# Patient Record
Sex: MALE | Race: WHITE | NOT HISPANIC OR LATINO | Employment: FULL TIME | URBAN - METROPOLITAN AREA
[De-identification: names, ages, dates, MRNs, and addresses within clinical notes are randomized per-mention and may not be internally consistent; named-entity substitution may affect disease eponyms.]

---

## 2019-07-24 ENCOUNTER — OFFICE VISIT (OUTPATIENT)
Dept: FAMILY MEDICINE | Facility: CLINIC | Age: 24
End: 2019-07-24
Payer: COMMERCIAL

## 2019-07-24 VITALS
HEART RATE: 95 BPM | WEIGHT: 144.2 LBS | SYSTOLIC BLOOD PRESSURE: 106 MMHG | TEMPERATURE: 98.4 F | BODY MASS INDEX: 19.53 KG/M2 | HEIGHT: 72 IN | OXYGEN SATURATION: 95 % | DIASTOLIC BLOOD PRESSURE: 75 MMHG

## 2019-07-24 DIAGNOSIS — Z00.00 ROUTINE HISTORY AND PHYSICAL EXAMINATION OF ADULT: Primary | ICD-10-CM

## 2019-07-24 DIAGNOSIS — Z20.2 EXPOSURE TO STD: ICD-10-CM

## 2019-07-24 DIAGNOSIS — Z20.6 HIV EXPOSURE: ICD-10-CM

## 2019-07-24 LAB
BUN SERPL-MCNC: 15.1 MG/DL (ref 7–21)
CALCIUM SERPL-MCNC: 9.5 MG/DL (ref 8.5–10.1)
CHLORIDE SERPLBLD-SCNC: 103.4 MMOL/L (ref 98–110)
CHOLEST SERPL-MCNC: 132.9 MG/DL (ref 0–200)
CHOLEST/HDLC SERPL: 2.8 {RATIO} (ref 0–5)
CO2 SERPL-SCNC: 30.5 MMOL/L (ref 20–32)
CREAT SERPL-MCNC: 0.8 MG/DL (ref 0.7–1.3)
GFR SERPL CREATININE-BSD FRML MDRD: >90 ML/MIN/1.7 M2
GLUCOSE CASUAL: 80 MG/DL (ref 51–200)
GLUCOSE SERPL-MCNC: 95 MG'DL (ref 70–99)
HBV SURFACE AB SERPL IA-ACNC: 0.51 M[IU]/ML
HBV SURFACE AG SERPL QL IA: NONREACTIVE
HCV AB SERPL QL IA: NONREACTIVE
HDLC SERPL-MCNC: 47.7 MG/DL
HIV 1+2 AB+HIV1 P24 AG SERPL QL IA: NONREACTIVE
LDLC SERPL CALC-MCNC: 59 MG/DL (ref 0–129)
POTASSIUM SERPL-SCNC: 3.9 MMOL/DL (ref 3.3–4.5)
SODIUM SERPL-SCNC: 138.4 MMOL/L (ref 132.6–141.4)
TRIGL SERPL-MCNC: 131.4 MG/DL (ref 0–150)
VLDL CHOLESTEROL: 26.3 MG/DL (ref 7–32)

## 2019-07-24 RX ORDER — EMTRICITABINE AND TENOFOVIR DISOPROXIL FUMARATE 200; 300 MG/1; MG/1
1 TABLET, FILM COATED ORAL DAILY
Qty: 90 TABLET | Refills: 0 | Status: SHIPPED | OUTPATIENT
Start: 2019-07-24

## 2019-07-24 ASSESSMENT — MIFFLIN-ST. JEOR: SCORE: 1682.09

## 2019-07-24 NOTE — PROGRESS NOTES
Male Physical Note    24 year oldmale here for CPE.    Other concerns include the following:  Concerns today:  1. Red spot  2. PrEP  3. Glass in foot      All - Med  Meds - no  Med Prob - no  Surg - no  Fx - no  Depression / anxiety - no    STD - no  Condom use:  >50%      PAST MEDICAL HISTORY  ALLERGIES  No Known Allergies    MEDS:   Current Outpatient Medications   Medication     emtricitabine-tenofovir (TRUVADA) 200-300 MG per tablet     No current facility-administered medications for this visit.        PROBLEM LIST  none      FAMILY HISTORY   grandparents with cancer (smokers)        SOCIAL HISTORY  Social History     Socioeconomic History     Marital status: Single     Spouse name: Not on file     Number of children: Not on file     Years of education: Not on file     Highest education level: Not on file   Occupational History     Occupation:      Comment: [employer]   Social Needs     Financial resource strain: Not on file     Food insecurity:     Worry: Not on file     Inability: Not on file     Transportation needs:     Medical: Not on file     Non-medical: Not on file   Tobacco Use     Smoking status: Never Smoker     Smokeless tobacco: Never Used   Substance and Sexual Activity     Alcohol use: Yes     Drug use: Yes     Types: Marijuana     Sexual activity: Yes     Partners: Male     Birth control/protection: Condom   Lifestyle     Physical activity:     Days per week: Not on file     Minutes per session: Not on file     Stress: Not on file   Relationships     Social connections:     Talks on phone: Not on file     Gets together: Not on file     Attends Moravian service: Not on file     Active member of club or organization: Not on file     Attends meetings of clubs or organizations: Not on file     Relationship status: Not on file     Intimate partner violence:     Fear of current or ex partner: Not on file     Emotionally abused: Not on file     Physically abused: Not on file      Forced sexual activity: Not on file   Other Topics Concern     Not on file   Social History [facility]  ([place])    Moved to \[facility]\"" - 2017    End of long term relationship - July 2019         Mental Health  Depression/Stress - none  Anxiety - none  Other      Other  Dental Care: Yes   Seat Belt Use: Yes       PREVENTIVE SCREENING    There is no immunization history on file for this patient.    Cholesterol Level (>46 yo or at risk):  done and HIV screening:  done          ROS:                      CONSTITUTIONAL: no fatigue, no unexpected change in weight  SKIN: no worrisome rashes, no worrisome moles, no worrisome lesions  EYES: no acute vision problems or changes  ENT: no ear problems, no mouth problems, no throat problems  RESP: no significant cough, no shortness of breath  CV: no chest pain, no palpitations, no new or worsening peripheral edema  GI: no nausea, no vomiting, no constipation, no diarrhea      EXAMINATION:  /75   Pulse 95   Temp 98.4  F (36.9  C) (Oral)   Ht 1.829 m (6')   Wt 65.4 kg (144 lb 3.2 oz)   SpO2 95%   BMI 19.56 kg/m    GENERAL: healthy, alert and no distress  EYES: Eyes grossly normal to inspection, extraocular movements - intact, and PERRL  HENT: ear canals- normal; TMs- normal; Nose- normal; Mouth- no ulcers, no lesions  NECK: no tenderness, no adenopathy, no asymmetry, no masses, no stiffness; thyroid- normal to palpation  RESP: lungs clear to auscultation - no rales, no rhonchi, no wheezes  CV: regular rates and rhythm, normal S1 S2, no S3 or S4 and no murmur, no click or rub -  ABDOMEN: soft, no tenderness, no  hepatosplenomegaly, no masses, normal bowel sounds  MS: extremities- no gross deformities noted, no edema  SKIN: no suspicious lesions, no rashes  NEURO: strength and tone- normal, sensory exam- grossly normal, mentation- intact, speech- normal, reflexes- symmetric  BACK: no CVA tenderness, no paralumbar tenderness  - male: testicles- normal, no  atrophy, no masses;  no inguinal hernias  PSYCH: Alert and oriented times 3; speech- coherent , normal rate and volume; able to articulate logical thoughts, able to abstract reason, no tangential thoughts, no hallucinations or delusions, affect- normal  LYMPHATICS: ant. cervical- normal, post. cervical- normal, axillary- normal, supraclavicular- normal, inguinal- normal        LABS  Pending -   BMP  STI  Lipid       ASSESSMENT/PLAN:    1. Routine history and physical examination of adult  - Lipid Cascade ([facility])  - Glucose Casual ([facility])    2. HIV exposure  Recently ended longterm relationship  Desires to start PrEP  Agreement reviewed  FAQ given - see AVS    - emtricitabine-tenofovir (TRUVADA) 200-300 MG per tablet; Take 1 tablet by mouth daily  Dispense: 90 tablet; Refill: 0    - Neisseria gonorrhoeae PCR  - Chlamydia trachomatis PCR  - Treponema Abs w Reflex to RPR and Titer  - HIV Antigen Antibody Combo  - Hepatitis B Surface Antibody  - Hepatitis B surface antigen  - Hepatitis C antibody  - Basic Metabolic Panel ([facility])      3. RTC 3 months  HIV,BMP  Renew miles Hernandez MD MPH

## 2019-07-24 NOTE — PATIENT INSTRUCTIONS
"  Pre-Exposure Prophylaxis (PrEP) for HIV Prevention   Frequently Asked Questions       What is PrEP?   \"PrEP\" stands for pre-exposure prophylaxis. The word \"prophylaxis\" (pronounced pro ryan ak sis) means to prevent or control the spread of an infection or disease. The goal of PrEP is to prevent HIV infection from taking hold if you are exposed to the virus. This is done by taking a pill that contains 2 HIV medications every day. These are the same medicines used to stop the virus from growing in people who are already infected.     Why take PrEP?   The HIV epidemic in the [place] is growing. About 50,000 people get infected with HIV each year. More of these infections are happening in some groups of people and some areas of the country than in others.     Is PrEP a vaccine?   No. PrEP medication does not work the same way as a vaccine. When you take a vaccine, it trains the body's immune system to fight off infection for years. You will need to take a pill every day by mouth for PrEP medications to protect you from infection. PrEP does not work after you stop taking it. The medication that was shown to be safe and to help block HIV infection is called \"Truvada\" (pronounced radha va duh). Truvada is a combination of 2 drugs (tenofovir and emtricitabine). These medicines work by blocking important pathways that the HIV virus uses to set up an infection. If you take Truvada as PrEP daily, the presence of the medication in your bloodstream can often stop the HIV virus from establishing itself and spreading in your body. If you do not take the Truvada pills every day, there may not be enough medicine in your blood stream to block the virus.     Should I consider taking PrEP?   PrEP is not for everyone. Doctors prescribe PrEP for some patients who have a very high risk of coming in contact with HIV by not using a condom when they have sex with a person who has HIV infection. You should consider PrEP if you are a man " or woman who sometimes has sex without using a condom, especially if you have a sex partner who you know has HIV infection. You should also consider PrEP if you don't know whether your partner has HIV infection but you know that your partner is at risk (for example, your partner injects drugs or is having sex with other people in addition to you) or if you have recently been told by a health care provider that you had a sexually transmitted infection. If your partner has HIV infection, PrEP may be an option to help protect you from getting HIV infection while you try to get pregnant, during pregnancy, or while breastfeeding.     How well does PrEP work?   PrEP was tested in several large studies with men who have sex with men, men who have sex with women and women who have sex with men. Several studies showed that PrEP reduced the risk of getting HIV infection.   More information on the details of these studies can be found at: http://www.cdc.gov/hiv/prep    Is PrEP safe?   The clinical trials also provided safety information on PrEP. Some people in the trials had early side effects such as an upset stomach or loss of appetite but these were mild and usually went away within the first month. Some people also had a mild headache. No serious side effects were observed. You should tell your doctor if these or other symptoms become severe or do not go away.     How can I start PrEP?   If you think you may be at high risk for HIV, talk to your doctor about PrEP. If you and your doctor agree that PrEP might reduce your risk of getting HIV infection, you will need to come in for a general health physical, blood tests for HIV, and tests for other infections that you can get from sex partners. Your blood will also be tested to see if your kidneys and liver are functioning well. If these tests show that PrEP medicines are likely to be safe for you to take and that you might benefit from PrEP, your doctor may give you a  prescription after discussing it with you.     Taking PrEP medicines will require you to follow-up regularly with your doctor. You will receive counseling on sexual behaviors and blood tests for HIV infection and to see if your body is reacting well to Truvada. You should take your medicine every day as prescribed, and your doctor will advise you about ways to help you take it regularly so that it stands the best chance to help you avoid HIV infection. Tell your doctor if you are having trouble remembering to take your medicine or if you want to stop PrEP.    If I take PrEP can I stop using condoms when I have sex?   You should not stop using condoms because you are taking PrEP. If PrEP is taken daily, it offers a lot of protection against HIV infection, but not 100%. Condoms also offer a lot of protection against HIV infection if they are used correctly every time you have sex, but not 100%. PrEP medications don't give you any protection from other infections you can get during sex, but condoms do. So you will get the most protection from HIV and other sexual infections if you consistently take PrEP medication and consistently use condoms during sex.       PrEP Agreement    It has been explained to me that:       Taking a dose of PrEP medication every day may lower my risk of getting HIV infection.    This medicine does not completely eliminate my risk of getting HIV infection, so I should use condoms during sex for additional protection.     This medicine may cause side effects so I should contact my provider for advice by calling [phone] if I have any health problems.      It is important for my health to find out quickly if I get HIV infection while I'm taking this medication, so I will contact my provider right away if I have symptoms of possible HIV infection (symptoms like fever with sore throat, rash, headache, or swollen glands).    My provider will test for HIV infection at least once every 3 months.      PrEP cannot be refilled without routine testing.      Therefore, I will:       Try my best to take the medication my provider has prescribed every day.     Talk to my provider about any problems I have in taking the medication every day.    Not share the medication with any other person.     Attend all my scheduled appointments/ complete all routine labs    Call [phone] to reschedule any appointments I cannot attend.

## 2019-07-24 NOTE — PROGRESS NOTES
"Male New Patient Note          HPI         Concerns today:  1. Red spot  2. PrEP  3. Glass in foot      All - Med  Meds - no  Med Prob - no  Surg - no  Fx - no  Depression / anxiety - no  STD - no            History reviewed. No pertinent family history.           Review of Systems:     Review of Systems:  {ROS NORMAL:136241::\"CONSTITUTIONAL: NEGATIVE for fever, chills, change in weight\",\"INTEGUMENTARY/SKIN: NEGATIVE for worrisome rashes, moles or lesions\",\"EYES: NEGATIVE for vision changes or irritation\",\"ENT/MOUTH: NEGATIVE for ear, mouth and throat problems\",\"RESP: NEGATIVE for significant cough or SOB\",\"BREAST: NEGATIVE for masses, tenderness or discharge\",\"CV: NEGATIVE for chest pain, palpitations or peripheral edema\",\"GI: NEGATIVE for nausea, abdominal pain, heartburn, or change in bowel habits\",\": NEGATIVE for frequency, dysuria, or hematuria\",\"MUSCULOSKELETAL: NEGATIVE for significant arthralgias or myalgia\",\"NEURO: NEGATIVE for weakness, dizziness or paresthesias\",\"ENDOCRINE: NEGATIVE for temperature intolerance, skin/hair changes\",\"HEME/ALLERGY: NEGATIVE for bleeding problems\",\"PSYCHIATRIC: NEGATIVE for changes in mood or affect\"}  Sleep:   Do you snore most or the night (as reported by a family member)? {NO IS DEFAULT/YES:27515911::\"No\"}  Do you feel sleepy or extremely tired during most of the day? {NO IS DEFAULT/YES:72969980::\"No\"}  {If both questions are answered with \"yes\" consider adding snoring in the problem list and evaluating the patient for PHILLIP with the dot phrase \".smicsleep\" either this visit or at a follow up visit }           Social History     Social History     Socioeconomic History     Marital status: Single     Spouse name: Not on file     Number of children: Not on file     Years of education: Not on file     Highest education level: Not on file   Occupational History     Not on file   Social Needs     Financial resource strain: Not on file     Food insecurity:     Worry: Not on file " "    Inability: Not on file     Transportation needs:     Medical: Not on file     Non-medical: Not on file   Tobacco Use     Smoking status: Never Smoker     Smokeless tobacco: Never Used   Substance and Sexual Activity     Alcohol use: Yes     Drug use: Yes     Types: Marijuana     Sexual activity: Yes     Partners: Male     Birth control/protection: Condom   Lifestyle     Physical activity:     Days per week: Not on file     Minutes per session: Not on file     Stress: Not on file   Relationships     Social connections:     Talks on phone: Not on file     Gets together: Not on file     Attends Restorationism service: Not on file     Active member of club or organization: Not on file     Attends meetings of clubs or organizations: Not on file     Relationship status: Not on file     Intimate partner violence:     Fear of current or ex partner: Not on file     Emotionally abused: Not on file     Physically abused: Not on file     Forced sexual activity: Not on file   Other Topics Concern     Not on file   Social History Narrative     Not on file       Marital Status:{MARITAL STATUS:852329}  Who lives in your household? ***    Has anyone hurt you physically, for example by pushing, hitting, slapping or kicking you or forcing you to have sex? {[facility] DENIES:883049::\"Denies\"}  Do you feel threatened or controlled by a partner, ex-partner or anyone in your life? {[facility] DENIES:249170::\"Denies\"}    Sexual Health     Sexual concerns: {YES / NO:033561::\"Yes\"}   STI History: {NEG/POS-NEG DEFAULT:002043::\"Neg\"}             Physical Exam:     Vitals: /75   Pulse 95   Temp 98.4  F (36.9  C) (Oral)   Ht 1.829 m (6')   Wt 65.4 kg (144 lb 3.2 oz)   SpO2 95%   BMI 19.56 kg/m    BMI= Body mass index is 19.56 kg/m .  {[facility] VITALS OPTIONS:545120::\"Vitals were reviewed and were normal\"}  {EXAM - MALE- zebra stripe:933669::\"GENERAL: healthy, alert and no distress\",\"EYES: Eyes grossly normal to inspection, extraocular movements - " "intact, and PERRL\",\"HENT: ear canals- normal; TMs- normal; Nose- normal; Mouth- no ulcers, no lesions\",\"NECK: no tenderness, no adenopathy, no asymmetry, no masses, no stiffness; thyroid- normal to palpation\",\"RESP: lungs clear to auscultation - no rales, no rhonchi, no wheezes\",\"BREAST: no masses, no tenderness, no nipple discharge, no palpable axillary masses or adenopathy\",\"CV: regular rates and rhythm, normal S1 S2, no S3 or S4 and no murmur, no click or rub -\",\"ABDOMEN: soft, no tenderness, no  hepatosplenomegaly, no masses, normal bowel sounds\",\"MS: extremities- no gross deformities noted, no edema\",\"SKIN: no suspicious lesions, no rashes\",\"NEURO: strength and tone- normal, sensory exam- grossly normal, mentation- intact, speech- normal, reflexes- symmetric\",\"BACK: no CVA tenderness, no paralumbar tenderness\",\"- male: testicles- normal, no atrophy, no masses;  no inguinal hernias\",\"RECTAL- male: no masses, no hemorhoids, Prostate- symmetric, no  nodularity, no masses, no hypertrophy\",\"PSYCH: Alert and oriented times 3; speech- coherent , normal rate and volume; able to articulate logical thoughts, able to abstract reason, no tangential thoughts, no hallucinations or delusions, affect- normal\",\"LYMPHATICS: ant. cervical- normal, post. cervical- normal, axillary- normal, supraclavicular- normal, inguinal- normal\"}    Assessment and Plan      There are no diagnoses linked to this encounter.      Options for treatment and follow-up care were reviewed with the patient. Davis Scott engaged in the decision making process and verbalized understanding of the options discussed and agreed with the final plan.    Rafael Mercedes MD  "

## 2019-07-25 ENCOUNTER — TELEPHONE (OUTPATIENT)
Dept: FAMILY MEDICINE | Facility: CLINIC | Age: 24
End: 2019-07-25

## 2019-07-25 ENCOUNTER — OFFICE VISIT (OUTPATIENT)
Dept: FAMILY MEDICINE | Facility: CLINIC | Age: 24
End: 2019-07-25
Payer: COMMERCIAL

## 2019-07-25 VITALS
TEMPERATURE: 98.2 F | SYSTOLIC BLOOD PRESSURE: 120 MMHG | RESPIRATION RATE: 16 BRPM | HEART RATE: 98 BPM | HEIGHT: 72 IN | OXYGEN SATURATION: 94 % | BODY MASS INDEX: 19.99 KG/M2 | WEIGHT: 147.6 LBS | DIASTOLIC BLOOD PRESSURE: 82 MMHG

## 2019-07-25 DIAGNOSIS — Z29.9 PREVENTIVE MEASURE: ICD-10-CM

## 2019-07-25 DIAGNOSIS — R36.9 PENILE DISCHARGE: Primary | ICD-10-CM

## 2019-07-25 LAB
C TRACH DNA SPEC QL NAA+PROBE: POSITIVE
N GONORRHOEA DNA SPEC QL NAA+PROBE: POSITIVE
SPECIMEN SOURCE: ABNORMAL
SPECIMEN SOURCE: ABNORMAL
T PALLIDUM AB SER QL: NONREACTIVE

## 2019-07-25 RX ORDER — AZITHROMYCIN 500 MG/1
1000 TABLET, FILM COATED ORAL DAILY
Qty: 2 TABLET | Refills: 0 | Status: SHIPPED | OUTPATIENT
Start: 2019-07-25 | End: 2019-07-26

## 2019-07-25 RX ORDER — CEFTRIAXONE SODIUM 250 MG
250 VIAL (EA) INJECTION ONCE
Status: COMPLETED | OUTPATIENT
Start: 2019-07-25 | End: 2019-07-25

## 2019-07-25 RX ADMIN — Medication 250 MG: at 17:06

## 2019-07-25 ASSESSMENT — PAIN SCALES - GENERAL: PAINLEVEL: NO PAIN (0)

## 2019-07-25 ASSESSMENT — MIFFLIN-ST. JEOR: SCORE: 1704.51

## 2019-07-25 NOTE — NURSING NOTE
Clinic Administered Medication Documentation      Injectable Medication Documentation    Patient was given Ceftriaxone Sodium (Rocephin). Prior to medication administration, verified patients identity using patient s name and date of birth. Please see MAR and medication order for additional information. Patient instructed to remain in clinic for 15 minutes.      Was entire vial of medication used? Yes  Vial/Syringe: Syringe  Expiration Date:  7/1/2021  Was this medication supplied by the patient? No    Name of provider who requested the medication administration: Radha  Name of provider on site (faculty or community preceptor) at the time of performing the medication administration: Pierre    Date of next administration: na   Date of next office visit with provider to renew medication plan (must be seen annually): swati Varghese, [facility]

## 2019-07-25 NOTE — TELEPHONE ENCOUNTER
Lab called and relayed that the patient had positive chlamydia and gonorrhea labs.  Ena Swanson, CMA

## 2019-07-25 NOTE — TELEPHONE ENCOUNTER
Pt came to clinic and treated by a provider in clinic after testing positive for STI both (Chalamydia and Gonorrhea).  See chart/Medication tab for details.    MD STD Case Report completed and mailed to [facility].    Carlos Marie RN

## 2019-07-25 NOTE — PROGRESS NOTES
"      HPI       Davis Scott is a 24 year old male, who presents with:  Chief Complaint   Patient presents with     RECHECK     patient is here for follow up no other concern      Was here for PrEP evaluation yesterday, noticed some penile discharge at night, checked in w/Dr. Mercedes who informed him that his GC/CH turned out positive, he is here for treatment.     Unprotected sex with one person since end of long-term relationship. Last sexual contact 5 days ago, with this person. 2 sexual contacts (including long-term partner) in the past 6 months.   He reports penetrating anal sex, oral sex only with this partner. No throat symptoms. No receiving anal intercourse.     He will contact partner to inform him of positive test and need for treatment. No h/o STI's, HIV, syphylis, hepB and hepC were negative.     He is unsure whether he was vaccinated for hepatitis B, but labs show no-immunity.     His insurance does not cover PrEP, [facility], as it is otherwise unaffordable.     Problem, Medication and Allergy Lists were reviewed and are current..    Patient is an established patient of this clinic.         Review of Systems     Complete ROS negative except as described in HPI.          Physical Exam     Vital signs:  /82   Pulse 98   Temp 98.2  F (36.8  C) (Oral)   Resp 16   Ht 1.84 m (6' 0.44\")   Wt 67 kg (147 lb 9.6 oz)   SpO2 94%   BMI 19.78 kg/m      Vitals were reviewed and were normal    General: very pleasant young male. Alert, interactive. Non-toxic.    HEENT: No signs of trauma. No rhinorrhea. Moist membranes.   Eyes: Conjunctivae are normal. Pupils are equal.   Neck: Normal range of motion.    Resp: No respiratory distress.    MSK: Normal range of motion. No obvious deformity.  Neuro: The patient is alert and interactive. Speech normal. No gross focal findings.   Skin: No lesion or sign of trauma noted.   Psych: Affect is concordant " with mood, behavior is normal.      Results     Results from last visit:  Office Visit on 07/24/2019   Component Date Value Ref Range Status     Specimen Descrip 07/24/2019 Urine   Final     N Gonorrhea PCR 07/24/2019 Positive* NEG^Negative Final    Comment: Positive for N. gonorrhoeae rRNA by transcription mediated amplification.  As is true for all non-culture methods, a positive specimen obtained from a   patient after therapeutic treatment cannot be interpreted as indicating the   presence of viable N. gonorrhoeae.  Significant Value messaged to  [facility] [phone] at 1140 on 07.25.19 ETK.       Specimen Description 07/24/2019 Urine   Final     Chlamydia Trachomatis PCR 07/24/2019 Positive* NEG^Negative Final    Comment: Positive for C. trachomatis rRNA by transcription mediated amplification.  As is true for all non-culture methods, a positive specimen obtained from a   patient after therapeutic treatment cannot be interpreted as indicating the   presence of viable C. trachomatis.  Significant Value messaged to  [facility] [phone] at 1140 on 07.25.19 ETK.       Treponema Antibodies 07/24/2019 Nonreactive  NR^Nonreactive Final     HIV Antigen Antibody Combo 07/24/2019 Nonreactive  NR^Nonreactive     Final    HIV-1 p24 Ag & HIV-1/HIV-2 Ab Not Detected     Hepatitis B Surface Antibody 07/24/2019 0.51  <8.00 m[IU]/mL Final    Nonreactive, No antibody detected when the value is less than 8.00 m[IU]/mL.     Hep B Surface Agn 07/24/2019 Nonreactive  NR^Nonreactive Final     Hepatitis C Antibody 07/24/2019 Nonreactive  NR^Nonreactive Final    Comment: Assay performance characteristics have not been established for newborns,   infants, and children       Cholesterol 07/24/2019 132.9  0.0 - 200.0 mg/dL Final     Cholesterol/HDL Ratio 07/24/2019 2.8  0.0 - 5.0 Final     HDL Cholesterol 07/24/2019 47.7  >40.0 mg/dL Final     LDL Cholesterol Calculated 07/24/2019 59  0 - 129 mg/dL Final     Triglycerides  07/24/2019 131.4  0.0 - 150.0 mg/dL Final     VLDL Cholesterol 07/24/2019 26.3  7.0 - 32.0 mg/dL Final     Glucose Casual 07/24/2019 80.0  51.0 - 200.0 mg/dL Final     Urea Nitrogen 07/24/2019 15.1  7.0 - 21.0 mg/dL Final     Calcium 07/24/2019 9.5  8.5 - 10.1 mg/dL Final     Chloride 07/24/2019 103.4  98.0 - 110.0 mmol/L Final     Carbon Dioxide 07/24/2019 30.5  20.0 - 32.0 mmol/L Final     Creatinine 07/24/2019 0.8  0.7 - 1.3 mg/dL Final     Glucose 07/24/2019 95.0  70.0 - 99.0 mg'dL Final     Potassium 07/24/2019 3.9  3.3 - 4.5 mmol/dL Final     Sodium 07/24/2019 138.4  132.6 - 141.4 mmol/L Final     GFR Estimate 07/24/2019 >90  >60.0 mL/min/1.7 m2 Final     GFR Estimate If Black 07/24/2019 >90  >60.0 mL/min/1.7 m2 Final       Assessment and Plan     1. Penile discharge  Will treat for GC and CT. Discussed no sex for 7 days, using condoms. Advised to re-check HIV in 3 months as his negative test yesterday does not ensure he doesn't have the infection.   He will contact partner to be checked and treated.   - azithromycin (ZITHROMAX) 500 MG tablet; Take 2 tablets (1,000 mg) by mouth daily for 1 day  Dispense: 2 tablet; Refill: 0  - cefTRIAXone (ROCEPHIN) injection 250 mg  - INJECTION INTRAMUSCULAR OR SUB-Q    2. Preventive measure  Recommended hepA, hepB, HPV vaccines, as well as Tdap booster. He is short of time today, so he will make an appointment just for those.   Recommend testing for MMR and VZV immunity.     He will return for any new/concerning symptoms.   [facility] paperwork completed and given to RN.     There are no discontinued medications.    Options for treatment and follow-up care were reviewed with the patient/caregivers. They engaged in the decision making process and verbalized understanding of the options discussed and agreed with the final plan.      Coco Garcia MD  Family Medicine Resident [facility]  Pager: [phone]

## 2019-07-25 NOTE — PROGRESS NOTES
Preceptor Attestation:   Patient seen and discussed with the resident. Assessment and plan reviewed with resident and agreed upon.  Treatment for both GC and Chlamydia given. Public Health forms done.   Davis is informing partner.   Return for further testing, e.g. A repeat HIV test.   Also, he's interested in PREP.    Supervising Physician:  Abe Jay MD  [facility]

## 2019-08-05 ENCOUNTER — OFFICE VISIT (OUTPATIENT)
Dept: FAMILY MEDICINE | Facility: CLINIC | Age: 24
End: 2019-08-05
Payer: COMMERCIAL

## 2019-08-05 VITALS
HEART RATE: 76 BPM | TEMPERATURE: 98.1 F | BODY MASS INDEX: 19.34 KG/M2 | WEIGHT: 142.8 LBS | SYSTOLIC BLOOD PRESSURE: 113 MMHG | OXYGEN SATURATION: 96 % | DIASTOLIC BLOOD PRESSURE: 75 MMHG | HEIGHT: 72 IN

## 2019-08-05 DIAGNOSIS — Z23 ENCOUNTER FOR IMMUNIZATION: ICD-10-CM

## 2019-08-05 DIAGNOSIS — Z13.9 SCREENING FOR CONDITION: Primary | ICD-10-CM

## 2019-08-05 ASSESSMENT — MIFFLIN-ST. JEOR: SCORE: 1675.74

## 2019-08-05 NOTE — PROGRESS NOTES
HPI       Davis Scott is a 24 year old male, who presents with:  Chief Complaint   Patient presents with     Imm/Inj     STD     testing     Last seen in clinic on 7/25, received ceftriaxone and azithromycin for +GC/CT. HIV, hepatitis B, C and syphilis were negative. He also showed negative serology for hepB.     He reports that he had intercourse with the same partner 2 days ago (Saturday), no other relationships in between. Partner reported treatment through [facility] on 7/26, and they have both been asymptomatic since. He is still concerned that he may have been reinfected and would like to be re-tested.     [facility] so insurance would cover part of the cost. He is tolerating it well.       Problem, Medication and Allergy Lists were reviewed and are current..    Patient is an established patient of this clinic.         Review of Systems     Complete ROS negative except as described in HPI.          Physical Exam     Vital signs:  /75   Pulse 76   Temp 98.1  F (36.7  C) (Oral)   Ht 1.829 m (6')   Wt 64.8 kg (142 lb 12.8 oz)   SpO2 96%   BMI 19.37 kg/m      Vitals were reviewed and were normal    General: Alert, interactive. Non-toxic.    HEENT: No signs of trauma. No rhinorrhea. Moist membranes.   Eyes: Conjunctivae are normal. Pupils are equal.   Neck: Normal range of motion.    Resp: No respiratory distress.    MSK: Normal range of motion. No obvious deformity.  Neuro: The patient is alert and interactive. Speech normal. No gross focal findings.   Skin: No lesion or sign of trauma noted.   Psych: Affect is appropriate and concordant with mood, behavior is normal.    Patient declined  exam.       Results     Pending     Assessment and Plan     1. Screening for condition  Will re-check for GC/CT, though I explained that since it's only been 2 days since the sexual encounter, it could be a false negative at this point, and that  he should come back for re-testing if he has symptoms or reasons to think he might be infected again.   Emphasized the importance of using condoms for prevention of all STI's, including HIV, despite him being on PreEP.  - Neisseria gonorrhoeae PCR  - Chlamydia trachomatis PCR    2. Encounter for immunization  We are awaiting records from OSH for the rest of the vaccines, but we will proceed with the following today as he does not think he has received them, and because of the negative serology for HepB on 7/24.   - HEPATITIS B VACCINE,ADULT,IM  - HPV9 (Gardasil 9 )  - TDAP VACCINE (BOOSTRIX)    There are no discontinued medications.    Options for treatment and follow-up care were reviewed with the patient/caregivers. They engaged in the decision making process and verbalized understanding of the options discussed and agreed with the final plan.      Coco Garcia MD  Family Medicine Resident [facility]  Pager: [phone]

## 2019-08-06 PROBLEM — Z29.9 PREVENTIVE MEASURE: Status: RESOLVED | Noted: 2019-07-25 | Resolved: 2019-08-06

## 2019-08-06 PROBLEM — Z11.3 SCREENING FOR STDS (SEXUALLY TRANSMITTED DISEASES): Status: RESOLVED | Noted: 2019-07-25 | Resolved: 2019-08-06

## 2019-08-06 LAB
C TRACH DNA SPEC QL NAA+PROBE: NEGATIVE
N GONORRHOEA DNA SPEC QL NAA+PROBE: NEGATIVE
SPECIMEN SOURCE: NORMAL
SPECIMEN SOURCE: NORMAL

## 2019-08-07 PROBLEM — Z29.9 PREVENTIVE MEASURE: Chronic | Status: ACTIVE | Noted: 2019-07-25

## 2019-08-08 NOTE — PROGRESS NOTES
Preceptor Attestation:   Patient seen, evaluated and discussed with the resident. I have verified the content of the note, which accurately reflects my assessment of the patient and the plan of care.   Supervising Physician:  Bryan Moon MD

## 2020-03-11 ENCOUNTER — HEALTH MAINTENANCE LETTER (OUTPATIENT)
Age: 25
End: 2020-03-11

## 2021-01-03 ENCOUNTER — HEALTH MAINTENANCE LETTER (OUTPATIENT)
Age: 26
End: 2021-01-03

## 2021-03-29 NOTE — PROGRESS NOTES
SUBJECTIVE:   CC: Davis Scott is an 25 year old male who presents for preventative health visit.       Patient has been advised of split billing requirements and indicates understanding: Yes  Healthy Habits:     Getting at least 3 servings of Calcium per day:  Yes    Bi-annual eye exam:  Yes    Dental care twice a year:  Yes    Sleep apnea or symptoms of sleep apnea:  None    Diet:  Regular (no restrictions)    Frequency of exercise:  4-5 days/week    Duration of exercise:  30-45 minutes    Taking medications regularly:  Yes    Medication side effects:  None    PHQ-2 Total Score: 0    Additional concerns today:  No          Family history of colon problems - was told by paternal grandmother that she and pt's father have to be checked for polyps every so often     Today's PHQ-2 Score:   PHQ-2 ( 1999 Pfizer) 3/30/2021   Q1: Little interest or pleasure in doing things 0   Q2: Feeling down, depressed or hopeless 0   PHQ-2 Score 0   Q1: Little interest or pleasure in doing things Not at all   Q2: Feeling down, depressed or hopeless Not at all   PHQ-2 Score 0       Abuse: Current or Past(Physical, Sexual or Emotional)- No  Do you feel safe in your environment? Yes    Have you ever done Advance Care Planning? (For example, a Health Directive, POLST, or a discussion with a medical provider or your loved ones about your wishes): No, advance care planning information given to patient to review.  Patient declined advance care planning discussion at this time.    Social History     Tobacco Use     Smoking status: Never Smoker     Smokeless tobacco: Never Used   Substance Use Topics     Alcohol use: Yes     Comment: occ     If you drink alcohol do you typically have >3 drinks per day or >7 drinks per week? No    Alcohol Use 3/30/2021   Prescreen: >3 drinks/day or >7 drinks/week? No   Prescreen: >3 drinks/day or >7 drinks/week? -   No flowsheet data found.    Last PSA: No results found for: PSA    Reviewed orders with patient.  "Reviewed health maintenance and updated orders accordingly - Yes  BP Readings from Last 3 Encounters:   03/30/21 117/73   08/05/19 113/75   07/25/19 120/82    Wt Readings from Last 3 Encounters:   03/30/21 71.2 kg (157 lb)   08/05/19 64.8 kg (142 lb 12.8 oz)   07/25/19 67 kg (147 lb 9.6 oz)                    Reviewed and updated as needed this visit by clinical staff  Tobacco  Allergies  Meds   Med Hx  Surg Hx  Fam Hx  Soc Hx        Reviewed and updated as needed this visit by Provider                    Review of Systems  CONSTITUTIONAL: NEGATIVE for fever, chills, change in weight  INTEGUMENTARY/SKIN: NEGATIVE for worrisome rashes, moles or lesions  EYES: NEGATIVE for vision changes or irritation  ENT: NEGATIVE for ear, mouth and throat problems  RESP: NEGATIVE for significant cough or SOB  CV: NEGATIVE for chest pain, palpitations or peripheral edema  GI: NEGATIVE for nausea, abdominal pain, heartburn, or change in bowel habits   male: negative for dysuria, hematuria, decreased urinary stream, erectile dysfunction, urethral discharge  MUSCULOSKELETAL: NEGATIVE for significant arthralgias or myalgia  NEURO: NEGATIVE for weakness, dizziness or paresthesias  PSYCHIATRIC: NEGATIVE for changes in mood or affect    OBJECTIVE:   /73   Pulse 79   Temp 97.7  F (36.5  C) (Skin)   Resp 16   Ht 1.816 m (5' 11.5\")   Wt 71.2 kg (157 lb)   SpO2 98%   BMI 21.59 kg/m      Physical Exam  GENERAL: alert and no distress  EYES: Eyes grossly normal to inspection, PERRL and conjunctivae and sclerae normal  HENT: ear canals and TM's normal, nose and mouth without ulcers or lesions  NECK: no adenopathy, no asymmetry, masses, or scars and thyroid normal to palpation  RESP: lungs clear to auscultation - no rales, rhonchi or wheezes  CV: regular rate and rhythm, normal S1 S2, no S3 or S4, no murmur, click or rub, no peripheral edema and peripheral pulses strong  ABDOMEN: soft, nontender, no hepatosplenomegaly, no " "masses and bowel sounds normal  MS: no gross musculoskeletal defects noted, no edema  SKIN: no suspicious lesions or rashes  NEURO: Normal strength and tone, mentation intact and speech normal  PSYCH: mentation appears normal, affect normal/bright    Diagnostic Test Results:  Labs reviewed in Epic    ASSESSMENT/PLAN:   1. Routine general medical examination at a health care facility  Overall, doing well.  Offered to update labs, declined.  Will be getting COVID vaccine this week, so will hold off on any other immunizations at this time.    He may need to start colon cancer screen at age 40.  He does not know much about father's history, they do not talk much.      Patient has been advised of split billing requirements and indicates understanding: Yes  COUNSELING:   Reviewed preventive health counseling, as reflected in patient instructions       Regular exercise       Healthy diet/nutrition       Colon cancer screening    Estimated body mass index is 21.59 kg/m  as calculated from the following:    Height as of this encounter: 1.816 m (5' 11.5\").    Weight as of this encounter: 71.2 kg (157 lb).         He reports that he has never smoked. He has never used smokeless tobacco.      Counseling Resources:  ATP IV Guidelines  Pooled Cohorts Equation Calculator  FRAX Risk Assessment  ICSI Preventive Guidelines  Dietary Guidelines for Americans, 2010  USDA's MyPlate  ASA Prophylaxis  Lung CA Screening    Cj Moyer PA-C  [facility]  "

## 2021-03-30 ENCOUNTER — OFFICE VISIT (OUTPATIENT)
Dept: FAMILY MEDICINE | Facility: CLINIC | Age: 26
End: 2021-03-30
Payer: COMMERCIAL

## 2021-03-30 VITALS
SYSTOLIC BLOOD PRESSURE: 117 MMHG | TEMPERATURE: 97.7 F | DIASTOLIC BLOOD PRESSURE: 73 MMHG | OXYGEN SATURATION: 98 % | HEIGHT: 72 IN | WEIGHT: 157 LBS | BODY MASS INDEX: 21.26 KG/M2 | HEART RATE: 79 BPM | RESPIRATION RATE: 16 BRPM

## 2021-03-30 DIAGNOSIS — Z00.00 ROUTINE GENERAL MEDICAL EXAMINATION AT A HEALTH CARE FACILITY: Primary | ICD-10-CM

## 2021-03-30 PROCEDURE — 99395 PREV VISIT EST AGE 18-39: CPT | Performed by: PHYSICIAN ASSISTANT

## 2021-03-30 ASSESSMENT — MIFFLIN-ST. JEOR: SCORE: 1727.21

## 2021-10-10 ENCOUNTER — HEALTH MAINTENANCE LETTER (OUTPATIENT)
Age: 26
End: 2021-10-10

## 2022-02-17 PROBLEM — Z79.899 ON PRE-EXPOSURE PROPHYLAXIS FOR HIV: Chronic | Status: ACTIVE | Noted: 2019-07-25

## 2022-03-18 ENCOUNTER — OFFICE VISIT (OUTPATIENT)
Dept: FAMILY MEDICINE | Facility: CLINIC | Age: 27
End: 2022-03-18
Payer: COMMERCIAL

## 2022-03-18 VITALS
OXYGEN SATURATION: 93 % | TEMPERATURE: 97.3 F | SYSTOLIC BLOOD PRESSURE: 131 MMHG | RESPIRATION RATE: 12 BRPM | BODY MASS INDEX: 21.45 KG/M2 | WEIGHT: 156 LBS | DIASTOLIC BLOOD PRESSURE: 70 MMHG | HEART RATE: 85 BPM

## 2022-03-18 DIAGNOSIS — R30.0 DYSURIA: Primary | ICD-10-CM

## 2022-03-18 LAB
ALBUMIN UR-MCNC: NEGATIVE MG/DL
APPEARANCE UR: CLEAR
BACTERIA #/AREA URNS HPF: NORMAL /HPF
BILIRUB UR QL STRIP: NEGATIVE
COLOR UR AUTO: YELLOW
GLUCOSE UR STRIP-MCNC: NEGATIVE MG/DL
HGB UR QL STRIP: NEGATIVE
KETONES UR STRIP-MCNC: NEGATIVE MG/DL
LEUKOCYTE ESTERASE UR QL STRIP: NEGATIVE
NITRATE UR QL: NEGATIVE
PH UR STRIP: 6.5 [PH] (ref 5–7)
RBC #/AREA URNS AUTO: NORMAL /HPF
SP GR UR STRIP: 1.02 (ref 1–1.03)
UROBILINOGEN UR STRIP-ACNC: 0.2 E.U./DL
WBC #/AREA URNS AUTO: NORMAL /HPF

## 2022-03-18 PROCEDURE — 86780 TREPONEMA PALLIDUM: CPT | Performed by: PHYSICIAN ASSISTANT

## 2022-03-18 PROCEDURE — 81001 URINALYSIS AUTO W/SCOPE: CPT | Performed by: PHYSICIAN ASSISTANT

## 2022-03-18 PROCEDURE — 99213 OFFICE O/P EST LOW 20 MIN: CPT | Mod: 25 | Performed by: PHYSICIAN ASSISTANT

## 2022-03-18 PROCEDURE — 87491 CHLMYD TRACH DNA AMP PROBE: CPT | Performed by: PHYSICIAN ASSISTANT

## 2022-03-18 PROCEDURE — 86695 HERPES SIMPLEX TYPE 1 TEST: CPT | Performed by: PHYSICIAN ASSISTANT

## 2022-03-18 PROCEDURE — 36415 COLL VENOUS BLD VENIPUNCTURE: CPT | Performed by: PHYSICIAN ASSISTANT

## 2022-03-18 PROCEDURE — 87591 N.GONORRHOEAE DNA AMP PROB: CPT | Performed by: PHYSICIAN ASSISTANT

## 2022-03-18 PROCEDURE — 86696 HERPES SIMPLEX TYPE 2 TEST: CPT | Performed by: PHYSICIAN ASSISTANT

## 2022-03-18 PROCEDURE — 87389 HIV-1 AG W/HIV-1&-2 AB AG IA: CPT | Performed by: PHYSICIAN ASSISTANT

## 2022-03-18 PROCEDURE — 96372 THER/PROPH/DIAG INJ SC/IM: CPT | Performed by: PHYSICIAN ASSISTANT

## 2022-03-18 ASSESSMENT — PAIN SCALES - GENERAL: PAINLEVEL: NO PAIN (0)

## 2022-03-18 NOTE — RESULT ENCOUNTER NOTE
Dear Davis,     Here are your recent urine test results which are checking for a basic bladder/urinary tract infection which are negative.  Your gonorrhea and chlamydia urine tests are still pending, I'll let you know when I see the results.     Regards,  Megan Lujan PA-C

## 2022-03-18 NOTE — PROGRESS NOTES
Assessment and Plan:     (R30.0) Dysuria  (primary encounter diagnosis)  Comment: tested + for chlamydia x 2 recently, treated with two rounds of doxy x 7 days then with moxifloxacin to cover mycoplasma genitalium, continues to have mild symptoms, urethral discharge has resolved, no abdominal pain, no scrotal pain/swelling  Plan: cefTRIAXone (ROCEPHIN) injection 500 mg,         NEISSERIA GONORRHOEA PCR, CHLAMYDIA TRACHOMATIS        PCR, UA with Microscopic reflex to Culture -         lab collect, HIV Antigen Antibody Combo,         Treponema Abs w Reflex to RPR and Titer, Herpes        Simplex Virus 1 and 2 IgG  Treated to cover gonorrhea since symptoms persist despite chlamydia treatment (IM rocephin 500mg)  Will test for above today and confirm resolution of chlamydia  Recommend no sexual activity until symptoms resolved  Continue PREP therapy and follow w/[facility] for that  If symptoms don't resolve would consider urology follow-up        INGRIS Rg   Davis is a 26 year old who presents for the following health issues     HPI     He had recent sexual encounter and then later partner told him he was having STI symptoms  He tested positive for chlamydia and was treated with doxycycline  Finished one week course and on the last day of the course he noticed some slight discomfort in penis, right at the tip  One week later he was still uncomfortable and was swabbed again and tested positive for chlamydia and was treated with doxycycline x one week, along with moxifloxacin for possible mycoplasma genitalium    He completed the second round of antibiotics on Monday, 4 days ago  He continues to have symptoms--discomfort at the tip of his penis and about 1 inch up urethra  He denies any discharge, fever/chills, dysuria, frequency, scrotal pain  He hasn't noticed any lesions on his penis    Review of Systems   See above      Objective    /70 (BP Location: Right arm, Patient  Position: Sitting, Cuff Size: Adult Regular)   Pulse 85   Temp 97.3  F (36.3  C) (Temporal)   Resp 12   Wt 70.8 kg (156 lb)   SpO2 93%   BMI 21.45 kg/m    Body mass index is 21.45 kg/m .     Physical Exam     GENERAL: healthy, alert and no distress  ENT: OP clear, no erythema, swelling or exudate  RESP: lungs clear to auscultation - no rales, no rhonchi, no wheezes  CV: regular rates and rhythm, normal S1 S2, no S3 or S4 and no murmur, no click or rub   ABD: soft, NT, +BS, no masses  : normal male genitalia, circumsized, no lesions on penis or scrotum, no penile discharge, no scrotal masses, swelling or erythema

## 2022-03-19 LAB
C TRACH DNA SPEC QL NAA+PROBE: NEGATIVE
HSV1 IGG SERPL QL IA: 0.24 INDEX
HSV1 IGG SERPL QL IA: NORMAL
HSV2 IGG SERPL QL IA: 0.24 INDEX
HSV2 IGG SERPL QL IA: NORMAL
N GONORRHOEA DNA SPEC QL NAA+PROBE: NEGATIVE
T PALLIDUM AB SER QL: NONREACTIVE

## 2022-03-21 LAB — HIV 1+2 AB+HIV1 P24 AG SERPL QL IA: NONREACTIVE

## 2022-03-21 NOTE — RESULT ENCOUNTER NOTE
Dear Davis,     Here are your recent HIV test result which are negative.    Please let us know if you have any questions or concerns.    Regards,  Megan Lujan PA-C

## 2022-03-21 NOTE — RESULT ENCOUNTER NOTE
Dear Davis,     Here are your recent results which are negative for syphilis, herpes virus, gonorrhea and chlamydia.    Please let us know if you have any questions or concerns.    Regards,  Mgean Lujan PA-C

## 2022-05-21 ENCOUNTER — HEALTH MAINTENANCE LETTER (OUTPATIENT)
Age: 27
End: 2022-05-21

## 2022-09-18 ENCOUNTER — HEALTH MAINTENANCE LETTER (OUTPATIENT)
Age: 27
End: 2022-09-18

## 2023-06-04 ENCOUNTER — HEALTH MAINTENANCE LETTER (OUTPATIENT)
Age: 28
End: 2023-06-04

## 2023-11-09 ENCOUNTER — OFFICE VISIT (OUTPATIENT)
Dept: FAMILY MEDICINE | Facility: CLINIC | Age: 28
End: 2023-11-09
Payer: COMMERCIAL

## 2023-11-09 VITALS
SYSTOLIC BLOOD PRESSURE: 110 MMHG | DIASTOLIC BLOOD PRESSURE: 67 MMHG | HEART RATE: 94 BPM | TEMPERATURE: 98.8 F | OXYGEN SATURATION: 97 % | HEIGHT: 72 IN | WEIGHT: 158 LBS | BODY MASS INDEX: 21.4 KG/M2 | RESPIRATION RATE: 16 BRPM

## 2023-11-09 DIAGNOSIS — Z23 NEED FOR VACCINATION: ICD-10-CM

## 2023-11-09 DIAGNOSIS — Z00.00 ROUTINE GENERAL MEDICAL EXAMINATION AT A HEALTH CARE FACILITY: Primary | ICD-10-CM

## 2023-11-09 PROCEDURE — 99395 PREV VISIT EST AGE 18-39: CPT | Mod: 25 | Performed by: PHYSICIAN ASSISTANT

## 2023-11-09 PROCEDURE — 91320 SARSCV2 VAC 30MCG TRS-SUC IM: CPT | Performed by: PHYSICIAN ASSISTANT

## 2023-11-09 PROCEDURE — 90471 IMMUNIZATION ADMIN: CPT | Performed by: PHYSICIAN ASSISTANT

## 2023-11-09 PROCEDURE — 90480 ADMN SARSCOV2 VAC 1/ONLY CMP: CPT | Performed by: PHYSICIAN ASSISTANT

## 2023-11-09 PROCEDURE — 90686 IIV4 VACC NO PRSV 0.5 ML IM: CPT | Performed by: PHYSICIAN ASSISTANT

## 2023-11-09 RX ORDER — FINASTERIDE 1 MG/1
1 TABLET, FILM COATED ORAL DAILY
COMMUNITY
Start: 2023-01-30 | End: 2024-02-05

## 2023-11-09 ASSESSMENT — ENCOUNTER SYMPTOMS
HEMATOCHEZIA: 0
NERVOUS/ANXIOUS: 0
MYALGIAS: 0
DYSURIA: 0
PARESTHESIAS: 0
SHORTNESS OF BREATH: 0
ABDOMINAL PAIN: 0
EYE PAIN: 0
DIZZINESS: 0
PALPITATIONS: 0
HEMATURIA: 0
FREQUENCY: 0
HEARTBURN: 0
ARTHRALGIAS: 0
SORE THROAT: 0
COUGH: 0
NAUSEA: 0
DIARRHEA: 0
WEAKNESS: 0
CONSTIPATION: 0
JOINT SWELLING: 0
FEVER: 0
CHILLS: 0
HEADACHES: 0

## 2023-11-09 NOTE — PROGRESS NOTES
"SUBJECTIVE:   CC: Davis is an 28 year old who presents for preventative health visit.       11/9/2023    10:43 AM   Additional Questions   Roomed by Brianna SPAIN MA   Accompanied by self         11/9/2023    10:43 AM   Patient Reported Additional Medications   Patient reports taking the following new medications none       Healthy Habits:     Getting at least 3 servings of Calcium per day:  Yes    Bi-annual eye exam:  Yes    Dental care twice a year:  Yes    Sleep apnea or symptoms of sleep apnea:  None    Diet:  Regular (no restrictions)    Frequency of exercise:  6-7 days/week    Duration of exercise:  45-60 minutes    Taking medications regularly:  Yes    Medication side effects:  None    Additional concerns today:  No      Today's PHQ-2 Score:       11/9/2023    10:42 AM   PHQ-2 ( 1999 Pfizer)   Q1: Little interest or pleasure in doing things 0   Q2: Feeling down, depressed or hopeless 0   PHQ-2 Score 0   Q1: Little interest or pleasure in doing things Not at all   Q2: Feeling down, depressed or hopeless Not at all   PHQ-2 Score 0               Social History     Tobacco Use    Smoking status: Never    Smokeless tobacco: Never   Substance Use Topics    Alcohol use: Yes     Comment: occ             11/9/2023    10:41 AM   Alcohol Use   Prescreen: >3 drinks/day or >7 drinks/week? No          No data to display                Last PSA: No results found for: \"PSA\"    Reviewed orders with patient. Reviewed health maintenance and updated orders accordingly - Yes  BP Readings from Last 3 Encounters:   11/09/23 110/67   03/18/22 131/70   03/30/21 117/73    Wt Readings from Last 3 Encounters:   11/09/23 71.7 kg (158 lb)   03/18/22 70.8 kg (156 lb)   03/30/21 71.2 kg (157 lb)                    Reviewed and updated as needed this visit by clinical staff   Tobacco  Allergies  Meds              Reviewed and updated as needed this visit by Provider                     Review of Systems   Constitutional:  Negative for chills " "and fever.   HENT:  Negative for congestion, ear pain, hearing loss and sore throat.    Eyes:  Negative for pain and visual disturbance.   Respiratory:  Negative for cough and shortness of breath.    Cardiovascular:  Negative for chest pain, palpitations and peripheral edema.   Gastrointestinal:  Negative for abdominal pain, constipation, diarrhea, heartburn, hematochezia and nausea.   Genitourinary:  Negative for dysuria, frequency, genital sores, hematuria, impotence, penile discharge and urgency.   Musculoskeletal:  Negative for arthralgias, joint swelling and myalgias.   Skin:  Negative for rash.   Neurological:  Negative for dizziness, weakness, headaches and paresthesias.   Psychiatric/Behavioral:  Negative for mood changes. The patient is not nervous/anxious.          OBJECTIVE:   /67   Pulse 94   Temp 98.8  F (37.1  C) (Temporal)   Resp 16   Ht 1.816 m (5' 11.5\")   Wt 71.7 kg (158 lb)   SpO2 97%   BMI 21.73 kg/m      Physical Exam  GENERAL: alert and no distress  EYES: Eyes grossly normal to inspection  HENT: ear canals and TM's normal, nose and mouth without ulcers or lesions  NECK: no adenopathy, no asymmetry, masses, or scars and thyroid normal to palpation  RESP: lungs clear to auscultation - no rales, rhonchi or wheezes  CV: regular rate and rhythm, normal S1 S2, no S3 or S4, no murmur, click or rub, no peripheral edema and peripheral pulses strong  ABDOMEN: soft, nontender, no hepatosplenomegaly, no masses and bowel sounds normal  MS: no gross musculoskeletal defects noted, no edema  SKIN: no suspicious lesions or rashes  NEURO: Normal strength and tone, mentation intact and speech normal  PSYCH: mentation appears normal, affect normal/bright    Diagnostic Test Results:  Labs reviewed in Epic    ASSESSMENT/PLAN:   (Z00.00) Routine general medical examination at a health care facility  (primary encounter diagnosis)  Comment:   Plan: PRIMARY CARE FOLLOW-UP SCHEDULING, REVIEW OF         " HEALTH MAINTENANCE PROTOCOL ORDERS            (Z23) Need for vaccination  Comment:   Plan: INFLUENZA VACCINE IM > 6 MONTHS VALENT IIV4         (AFLURIA/FLUZONE), COVID-19 12+ (2023-24)         (PFIZER)                  COUNSELING:   Reviewed preventive health counseling, as reflected in patient instructions       Regular exercise       Healthy diet/nutrition        He reports that he has never smoked. He has never used smokeless tobacco.            INGRIS [facility]

## 2024-02-05 ENCOUNTER — MYC REFILL (OUTPATIENT)
Dept: FAMILY MEDICINE | Facility: CLINIC | Age: 29
End: 2024-02-05
Payer: COMMERCIAL

## 2024-02-05 DIAGNOSIS — L65.9 HAIR LOSS: Primary | ICD-10-CM

## 2024-02-05 RX ORDER — FINASTERIDE 1 MG/1
1 TABLET, FILM COATED ORAL DAILY
Qty: 90 TABLET | Refills: 3 | Status: SHIPPED | OUTPATIENT
Start: 2024-02-05

## 2024-10-10 ENCOUNTER — PATIENT OUTREACH (OUTPATIENT)
Dept: CARE COORDINATION | Facility: CLINIC | Age: 29
End: 2024-10-10
Payer: COMMERCIAL

## 2024-10-24 ENCOUNTER — PATIENT OUTREACH (OUTPATIENT)
Dept: CARE COORDINATION | Facility: CLINIC | Age: 29
End: 2024-10-24
Payer: COMMERCIAL

## 2025-02-15 ENCOUNTER — HEALTH MAINTENANCE LETTER (OUTPATIENT)
Age: 30
End: 2025-02-15

## 2025-05-08 ENCOUNTER — PATIENT OUTREACH (OUTPATIENT)
Dept: CARE COORDINATION | Facility: CLINIC | Age: 30
End: 2025-05-08
Payer: COMMERCIAL